# Patient Record
Sex: FEMALE | Race: WHITE | NOT HISPANIC OR LATINO | Employment: OTHER | URBAN - METROPOLITAN AREA
[De-identification: names, ages, dates, MRNs, and addresses within clinical notes are randomized per-mention and may not be internally consistent; named-entity substitution may affect disease eponyms.]

---

## 2023-07-07 LAB
ALANINE AMINOTRANSFERASE (SGPT) (U/L) IN SER/PLAS: 25 U/L (ref 7–45)
ALBUMIN (G/DL) IN SER/PLAS: 4.1 G/DL (ref 3.4–5)
ALBUMIN (MG/L) IN URINE: <7 MG/L
ALBUMIN/CREATININE (UG/MG) IN URINE: NORMAL UG/MG CRT (ref 0–30)
ALKALINE PHOSPHATASE (U/L) IN SER/PLAS: 65 U/L (ref 33–136)
ANION GAP IN SER/PLAS: 11 MMOL/L (ref 10–20)
APPEARANCE, URINE: CLEAR
ASPARTATE AMINOTRANSFERASE (SGOT) (U/L) IN SER/PLAS: 21 U/L (ref 9–39)
BASOPHILS (10*3/UL) IN BLOOD BY AUTOMATED COUNT: 0.06 X10E9/L (ref 0–0.1)
BASOPHILS/100 LEUKOCYTES IN BLOOD BY AUTOMATED COUNT: 0.9 % (ref 0–2)
BILIRUBIN TOTAL (MG/DL) IN SER/PLAS: 0.5 MG/DL (ref 0–1.2)
BILIRUBIN, URINE: NEGATIVE
BLOOD, URINE: NEGATIVE
CALCIUM (MG/DL) IN SER/PLAS: 9.3 MG/DL (ref 8.6–10.3)
CARBON DIOXIDE, TOTAL (MMOL/L) IN SER/PLAS: 29 MMOL/L (ref 21–32)
CHLORIDE (MMOL/L) IN SER/PLAS: 100 MMOL/L (ref 98–107)
CHOLESTEROL (MG/DL) IN SER/PLAS: 178 MG/DL (ref 0–199)
CHOLESTEROL IN HDL (MG/DL) IN SER/PLAS: 81.8 MG/DL
CHOLESTEROL/HDL RATIO: 2.2
COLOR, URINE: ABNORMAL
CREATININE (MG/DL) IN SER/PLAS: 0.82 MG/DL (ref 0.5–1.05)
CREATININE (MG/DL) IN URINE: 38.7 MG/DL (ref 20–320)
EOSINOPHILS (10*3/UL) IN BLOOD BY AUTOMATED COUNT: 0.13 X10E9/L (ref 0–0.4)
EOSINOPHILS/100 LEUKOCYTES IN BLOOD BY AUTOMATED COUNT: 2 % (ref 0–6)
ERYTHROCYTE DISTRIBUTION WIDTH (RATIO) BY AUTOMATED COUNT: 12.2 % (ref 11.5–14.5)
ERYTHROCYTE MEAN CORPUSCULAR HEMOGLOBIN CONCENTRATION (G/DL) BY AUTOMATED: 33.2 G/DL (ref 32–36)
ERYTHROCYTE MEAN CORPUSCULAR VOLUME (FL) BY AUTOMATED COUNT: 96 FL (ref 80–100)
ERYTHROCYTES (10*6/UL) IN BLOOD BY AUTOMATED COUNT: 4.38 X10E12/L (ref 4–5.2)
GFR FEMALE: 76 ML/MIN/1.73M2
GLUCOSE (MG/DL) IN SER/PLAS: 86 MG/DL (ref 74–99)
GLUCOSE, URINE: NEGATIVE MG/DL
HEMATOCRIT (%) IN BLOOD BY AUTOMATED COUNT: 41.9 % (ref 36–46)
HEMOGLOBIN (G/DL) IN BLOOD: 13.9 G/DL (ref 12–16)
IMMATURE GRANULOCYTES/100 LEUKOCYTES IN BLOOD BY AUTOMATED COUNT: 0.3 % (ref 0–0.9)
KETONES, URINE: NEGATIVE MG/DL
LDL: 81 MG/DL (ref 0–99)
LEUKOCYTE ESTERASE, URINE: ABNORMAL
LEUKOCYTES (10*3/UL) IN BLOOD BY AUTOMATED COUNT: 6.5 X10E9/L (ref 4.4–11.3)
LYMPHOCYTES (10*3/UL) IN BLOOD BY AUTOMATED COUNT: 1.91 X10E9/L (ref 0.8–3)
LYMPHOCYTES/100 LEUKOCYTES IN BLOOD BY AUTOMATED COUNT: 29.6 % (ref 13–44)
MONOCYTES (10*3/UL) IN BLOOD BY AUTOMATED COUNT: 0.67 X10E9/L (ref 0.05–0.8)
MONOCYTES/100 LEUKOCYTES IN BLOOD BY AUTOMATED COUNT: 10.4 % (ref 2–10)
NEUTROPHILS (10*3/UL) IN BLOOD BY AUTOMATED COUNT: 3.66 X10E9/L (ref 1.6–5.5)
NEUTROPHILS/100 LEUKOCYTES IN BLOOD BY AUTOMATED COUNT: 56.8 % (ref 40–80)
NITRITE, URINE: NEGATIVE
PH, URINE: 8 (ref 5–8)
PLATELETS (10*3/UL) IN BLOOD AUTOMATED COUNT: 294 X10E9/L (ref 150–450)
POTASSIUM (MMOL/L) IN SER/PLAS: 3.6 MMOL/L (ref 3.5–5.3)
PROTEIN TOTAL: 6.6 G/DL (ref 6.4–8.2)
PROTEIN, URINE: NEGATIVE MG/DL
RBC, URINE: <1 /HPF (ref 0–5)
SODIUM (MMOL/L) IN SER/PLAS: 136 MMOL/L (ref 136–145)
SPECIFIC GRAVITY, URINE: 1.01 (ref 1–1.03)
SQUAMOUS EPITHELIAL CELLS, URINE: 1 /HPF
TRIGLYCERIDE (MG/DL) IN SER/PLAS: 76 MG/DL (ref 0–149)
UREA NITROGEN (MG/DL) IN SER/PLAS: 11 MG/DL (ref 6–23)
UROBILINOGEN, URINE: <2 MG/DL (ref 0–1.9)
VLDL: 15 MG/DL (ref 0–40)
WBC, URINE: <1 /HPF (ref 0–5)

## 2024-10-22 ENCOUNTER — HOSPITAL ENCOUNTER (OUTPATIENT)
Dept: RADIOLOGY | Facility: CLINIC | Age: 74
Discharge: HOME | End: 2024-10-22
Payer: MEDICARE

## 2024-10-22 ENCOUNTER — OFFICE VISIT (OUTPATIENT)
Dept: ORTHOPEDIC SURGERY | Facility: CLINIC | Age: 74
End: 2024-10-22
Payer: MEDICARE

## 2024-10-22 VITALS — HEIGHT: 63 IN | WEIGHT: 130 LBS | BODY MASS INDEX: 23.04 KG/M2

## 2024-10-22 DIAGNOSIS — M54.50 LUMBAR PAIN: ICD-10-CM

## 2024-10-22 DIAGNOSIS — M54.16 LUMBAR RADICULOPATHY: Primary | ICD-10-CM

## 2024-10-22 PROCEDURE — 1125F AMNT PAIN NOTED PAIN PRSNT: CPT | Performed by: ORTHOPAEDIC SURGERY

## 2024-10-22 PROCEDURE — 3008F BODY MASS INDEX DOCD: CPT | Performed by: ORTHOPAEDIC SURGERY

## 2024-10-22 PROCEDURE — 99204 OFFICE O/P NEW MOD 45 MIN: CPT | Performed by: ORTHOPAEDIC SURGERY

## 2024-10-22 PROCEDURE — 1036F TOBACCO NON-USER: CPT | Performed by: ORTHOPAEDIC SURGERY

## 2024-10-22 PROCEDURE — 99215 OFFICE O/P EST HI 40 MIN: CPT | Performed by: ORTHOPAEDIC SURGERY

## 2024-10-22 PROCEDURE — 72110 X-RAY EXAM L-2 SPINE 4/>VWS: CPT | Performed by: ORTHOPAEDIC SURGERY

## 2024-10-22 PROCEDURE — 72120 X-RAY BEND ONLY L-S SPINE: CPT

## 2024-10-22 RX ORDER — FUROSEMIDE 20 MG/1
TABLET ORAL
COMMUNITY
Start: 2024-10-11

## 2024-10-22 RX ORDER — ROSUVASTATIN CALCIUM 20 MG/1
1 TABLET, COATED ORAL NIGHTLY
COMMUNITY
Start: 2024-10-16

## 2024-10-22 RX ORDER — PANTOPRAZOLE SODIUM 40 MG/1
1 TABLET, DELAYED RELEASE ORAL
COMMUNITY
Start: 2024-07-03

## 2024-10-22 RX ORDER — SPIRONOLACTONE 25 MG/1
25 TABLET ORAL
COMMUNITY
Start: 2024-10-17

## 2024-10-22 RX ORDER — METOPROLOL TARTRATE AND HYDROCHLOROTHIAZIDE 100; 25 MG/1; MG/1
1 TABLET ORAL DAILY
COMMUNITY

## 2024-10-22 RX ORDER — ESTRADIOL 0.1 MG/G
CREAM VAGINAL
COMMUNITY
Start: 2024-03-27

## 2024-10-22 RX ORDER — METHOCARBAMOL 750 MG/1
750 TABLET, FILM COATED ORAL DAILY PRN
COMMUNITY
Start: 2024-06-26

## 2024-10-22 RX ORDER — PANTOPRAZOLE SODIUM 40 MG/1
40 TABLET, DELAYED RELEASE ORAL
COMMUNITY

## 2024-10-22 RX ORDER — TRIAMTERENE/HYDROCHLOROTHIAZID 37.5-25 MG
TABLET ORAL
COMMUNITY
Start: 2023-04-14

## 2024-10-22 RX ORDER — NEBIVOLOL 5 MG/1
1 TABLET ORAL
COMMUNITY
Start: 2024-08-02

## 2024-10-22 RX ORDER — BACLOFEN 5 MG/1
TABLET ORAL
COMMUNITY
Start: 2024-02-14

## 2024-10-22 RX ORDER — POTASSIUM CHLORIDE 1500 MG/1
TABLET, EXTENDED RELEASE ORAL
COMMUNITY

## 2024-10-22 RX ORDER — ACETAMINOPHEN 500 MG
500 TABLET ORAL EVERY 8 HOURS PRN
COMMUNITY

## 2024-10-22 RX ORDER — TRIAMTERENE AND HYDROCHLOROTHIAZIDE 37.5; 25 MG/1; MG/1
1 CAPSULE ORAL DAILY
COMMUNITY

## 2024-10-22 RX ORDER — AZITHROMYCIN 250 MG/1
TABLET, FILM COATED ORAL
COMMUNITY
Start: 2024-02-22

## 2024-10-22 RX ORDER — NAPROXEN SODIUM 220 MG/1
1 TABLET, FILM COATED ORAL DAILY
COMMUNITY

## 2024-10-22 RX ORDER — ALBUTEROL SULFATE 90 UG/1
INHALANT RESPIRATORY (INHALATION)
COMMUNITY

## 2024-10-22 RX ORDER — NITROGLYCERIN 0.4 MG/1
1 TABLET SUBLINGUAL AS NEEDED
COMMUNITY
Start: 2023-04-25

## 2024-10-22 RX ORDER — GABAPENTIN 100 MG/1
100 CAPSULE ORAL 3 TIMES DAILY
COMMUNITY

## 2024-10-22 RX ORDER — SCOLOPAMINE TRANSDERMAL SYSTEM 1 MG/1
PATCH, EXTENDED RELEASE TRANSDERMAL
COMMUNITY
Start: 2017-07-08

## 2024-10-22 RX ORDER — METOPROLOL SUCCINATE 25 MG/1
1 TABLET, EXTENDED RELEASE ORAL DAILY
COMMUNITY

## 2024-10-22 RX ORDER — HYDROCHLOROTHIAZIDE 25 MG/1
TABLET ORAL
COMMUNITY
Start: 2017-07-08

## 2024-10-22 RX ORDER — OXYCODONE HYDROCHLORIDE AND ACETAMINOPHEN 5; 325 MG/1; MG/1
TABLET ORAL
COMMUNITY
Start: 2024-10-17 | End: 2024-10-22

## 2024-10-22 RX ORDER — TRAMADOL HYDROCHLORIDE 50 MG/1
1 TABLET ORAL EVERY 8 HOURS PRN
COMMUNITY
Start: 2024-08-31

## 2024-10-22 RX ORDER — ROSUVASTATIN CALCIUM 20 MG/1
20 TABLET, COATED ORAL NIGHTLY
COMMUNITY

## 2024-10-22 ASSESSMENT — PAIN - FUNCTIONAL ASSESSMENT: PAIN_FUNCTIONAL_ASSESSMENT: 0-10

## 2024-10-22 ASSESSMENT — PAIN SCALES - GENERAL: PAINLEVEL_OUTOF10: 3

## 2024-10-22 NOTE — PROGRESS NOTES
Corinne Cruz is a 73 y.o. female who presents for New Patient Visit of the Lower Back (X-rays today/MRI done at [facility]    HPI:  73-year-old female here for new patient evaluation of low back pain.  X-rays today, she has an MRI on disc from [facility]  She denies any fever chills nausea vomiting night sweats.  She has no bowel or bladder complaints.    Physical exam:  Well-nourished, well kept.No lymphangitis or lymphadenopathy in the examined extremities.  Gait normal.  Can stand on heels and toes.   Examination of the back shows no tenderness in the paraspinous musculature.  There is no significant decreased range of motion in all directions due to guarding/muscle spasms and pain at extremes.  There is good strength and no instability.  Examination of the lower extremities reveals no point tenderness, swelling, or deformity.  Range of motion of the hips, knees, and ankles are full without crepitance, instability, or exacerbation of pain, except she is moderately tender over her right greater trochanteric bursa.  Strength is 5/5 throughout.  No redness, abrasions, or lesions on extremities  Gross sensation intact in the extremities.  Deep tendon reflexes 1+ bilateral. Clonus negative.  Affect normal.  Alert and oriented ×3.  Coordination normal.    Imaging studies:  We ordered and reviewed AP lateral flexion-extension plain films of the lumbar spine today.  An MRI from [facility] from June 21, 2024 was reviewed today.    Assessment:  73-year-old female here for new patient evaluation of mostly right leg pain.  She has an MRI in the system from [facility] she woke up about 6 months ago and noticed that her right leg was completely numb, that was followed by pain throughout the right leg.  She never had any real back pain to speak of.  No left-sided involvement.  She does not have a history of any recent physical therapy or chiropractic care, however she did see an acupuncturist that gave her minimal relief.  No history of back  surgery.  She has been seeing orthopedic specialist at [facility], and was referred out for injections so she went to [facility].  She did get a series of 3 injections into her low back, the most recent was around mid August or so.  She did finally get some relief of her right leg pain after the third injection, however she is definitely not back to 100% yet.  She is here to discuss options.  She is moderately tender over her right greater trochanteric bursa.  She has moderate central stenosis at L3-4 and L4-5 and L5-S1 but she has severe bilateral foraminal stenosis at L5-S1.  She has a small spondylolisthesis at L3 on 4, L4 on 5 and L5 on S1.    We have ordered and reviewed test today, x-rays, MRI.  We reviewed the notes from her orthopedic visits at [facility] from June 26, 2024 this does discuss her MRI results and her back pain.  This is an exacerbation of a chronic problem that is affecting her bodily function.  We did consider and discuss surgery today.    For complete plan and/or surgical details, please refer to Dr. Roberson's portion of this split dictation.    -Jacob Bates PA-C    In a face-to-face encounter, I performed a history and physical examination, discussed pertinent diagnostic studies if indicated, and discussed diagnosis and management strategies with both the patient and the midlevel provider.  I reviewed the midlevel's note and agree with the documented findings and plan of care.    Patient with a 6-month history of right leg pain weakness numbness and tingling.  The pain and numbness has improved somewhat with a few different epidurals.  Her last epidural was in August.  She thinks the weakness is still persistent.  She has difficulty with stairs.  She was playing pickle ball prior to all this and now she cannot do that because of some instability on her feet because of that right leg.  There is no significant left-sided symptoms.  This is all right-sided.  On exam I get a little  weakness with hip flexion and knee extension on the right.  She definitely has plantarflexion weakness on the right if she cannot get up on her tiptoes on the right side but she can on the left.  There is a little dorsiflexion weakness right versus left as well.  X-rays and MRI were reviewed.  She has degenerative disks and spondylolisthesis at L3-4 L4-5 and L5-S1.  She has central stenosis moderate and right sided lateral recess at L3-4 she has central stenosis moderate and right-sided lateral recess at L4-5 and she has severe bilateral foraminal stenosis at L5-S1 from her spondylolisthesis.    Assessment/plan: Patient with imaging studies and symptoms.  I explained to her the longer she sits on her weakness is more permanent it will be.  She understands that.  I had a long discussion with the patient and explained to them that their options are 1) live with the symptoms and see how they evolve, 2) physical therapy, 3) pain management or 4) surgery.    At this point the patient wants to try some physical therapy.  We did discuss and consider surgery on her today which would be an L3-S1 midline laminectomy the screws at all levels and a TLIF at L5-S1.  She wants to try to hold off on that for now.  She understands the potential risks of surgery as well as the potential ramifications of not having surgery particularly persistence and permanency of symptoms.    The patient will try some physical therapy as we give us a call if she wants to proceed with surgery.  She is already in pain management.  Steve Roberson MD  Orthopedic surgery

## 2024-10-28 ENCOUNTER — CLINICAL SUPPORT (OUTPATIENT)
Dept: URGENT CARE | Age: 74
End: 2024-10-28
Payer: MEDICARE

## 2024-10-28 VITALS
RESPIRATION RATE: 16 BRPM | SYSTOLIC BLOOD PRESSURE: 159 MMHG | HEART RATE: 73 BPM | OXYGEN SATURATION: 96 % | WEIGHT: 129 LBS | DIASTOLIC BLOOD PRESSURE: 90 MMHG | TEMPERATURE: 99 F | BODY MASS INDEX: 22.86 KG/M2 | HEIGHT: 63 IN

## 2024-10-28 DIAGNOSIS — H61.22 IMPACTED CERUMEN OF LEFT EAR: ICD-10-CM

## 2024-10-28 DIAGNOSIS — H66.002 NON-RECURRENT ACUTE SUPPURATIVE OTITIS MEDIA OF LEFT EAR WITHOUT SPONTANEOUS RUPTURE OF TYMPANIC MEMBRANE: Primary | ICD-10-CM

## 2024-10-28 PROCEDURE — 69210 REMOVE IMPACTED EAR WAX UNI: CPT | Performed by: PHYSICIAN ASSISTANT

## 2024-10-28 PROCEDURE — 99213 OFFICE O/P EST LOW 20 MIN: CPT | Performed by: PHYSICIAN ASSISTANT

## 2024-10-28 RX ORDER — AMOXICILLIN AND CLAVULANATE POTASSIUM 875; 125 MG/1; MG/1
875 TABLET, FILM COATED ORAL 2 TIMES DAILY
Qty: 14 TABLET | Refills: 0 | Status: SHIPPED | OUTPATIENT
Start: 2024-10-28 | End: 2024-11-04

## 2024-10-28 RX ORDER — FUROSEMIDE 20 MG/1
TABLET ORAL
COMMUNITY

## 2024-10-28 ASSESSMENT — ENCOUNTER SYMPTOMS
WHEEZING: 0
FATIGUE: 1
DEPRESSION: 0
COUGH: 1
FEVER: 0
SORE THROAT: 0
RHINORRHEA: 0
OCCASIONAL FEELINGS OF UNSTEADINESS: 0
SINUS PAIN: 0
LOSS OF SENSATION IN FEET: 0
MYALGIAS: 0
SWOLLEN GLANDS: 0
HEADACHES: 0

## 2024-11-26 ENCOUNTER — APPOINTMENT (OUTPATIENT)
Dept: ORTHOPEDIC SURGERY | Facility: CLINIC | Age: 74
End: 2024-11-26
Payer: MEDICARE

## 2025-01-20 ENCOUNTER — APPOINTMENT (OUTPATIENT)
Dept: NEUROSURGERY | Facility: CLINIC | Age: 75
End: 2025-01-20
Payer: MEDICARE

## 2025-01-20 VITALS
WEIGHT: 136.8 LBS | HEART RATE: 69 BPM | SYSTOLIC BLOOD PRESSURE: 116 MMHG | DIASTOLIC BLOOD PRESSURE: 60 MMHG | HEIGHT: 63 IN | BODY MASS INDEX: 24.24 KG/M2

## 2025-01-20 DIAGNOSIS — M54.16 LUMBAR RADICULOPATHY: Primary | ICD-10-CM

## 2025-01-20 PROCEDURE — 1125F AMNT PAIN NOTED PAIN PRSNT: CPT | Performed by: STUDENT IN AN ORGANIZED HEALTH CARE EDUCATION/TRAINING PROGRAM

## 2025-01-20 PROCEDURE — 1159F MED LIST DOCD IN RCRD: CPT | Performed by: STUDENT IN AN ORGANIZED HEALTH CARE EDUCATION/TRAINING PROGRAM

## 2025-01-20 PROCEDURE — 1036F TOBACCO NON-USER: CPT | Performed by: STUDENT IN AN ORGANIZED HEALTH CARE EDUCATION/TRAINING PROGRAM

## 2025-01-20 PROCEDURE — 99205 OFFICE O/P NEW HI 60 MIN: CPT | Performed by: STUDENT IN AN ORGANIZED HEALTH CARE EDUCATION/TRAINING PROGRAM

## 2025-01-20 PROCEDURE — 3008F BODY MASS INDEX DOCD: CPT | Performed by: STUDENT IN AN ORGANIZED HEALTH CARE EDUCATION/TRAINING PROGRAM

## 2025-01-20 RX ORDER — SCOPOLAMINE 1 MG/3D
1 PATCH, EXTENDED RELEASE TRANSDERMAL
COMMUNITY
Start: 2024-12-03

## 2025-01-20 ASSESSMENT — PATIENT HEALTH QUESTIONNAIRE - PHQ9
SUM OF ALL RESPONSES TO PHQ9 QUESTIONS 1 AND 2: 0
1. LITTLE INTEREST OR PLEASURE IN DOING THINGS: NOT AT ALL
2. FEELING DOWN, DEPRESSED OR HOPELESS: NOT AT ALL

## 2025-01-20 ASSESSMENT — PAIN SCALES - GENERAL: PAINLEVEL_OUTOF10: 2

## 2025-01-20 NOTE — PROGRESS NOTES
Corinne Cruz is a 74 y.o. year old female p/w low back pain and right leg pain for 2nd opinion.     14/14 systems reviewed and negative other than what is listed in the history of present illness        Past Medical History:   Diagnosis Date    Personal history of other diseases of the circulatory system     History of hypertension       Past Surgical History:   Procedure Laterality Date    OTHER SURGICAL HISTORY  08/02/2018    Arthroplasty For Hammertoe    US ASPIRATION INJECTION INTERMEDIATE JOINT  11/25/2019    US ASPIRATION INJECTION INTERMEDIATE JOINT 11/25/2019 [facility]           Current Outpatient Medications:     acetaminophen (Tylenol) 500 mg tablet, Take 1 tablet (500 mg) by mouth every 8 hours if needed., Disp: , Rfl:     albuterol 90 mcg/actuation inhaler, 2 PUFFS FOR CHEST CONGESTION/WHEEZING, INHALED UP TO 4 TIMES DAILY, AS NEEDED, Disp: , Rfl:     aspirin 81 mg chewable tablet, Chew 1 tablet (81 mg) once daily., Disp: , Rfl:     estradiol (Estrace) 0.01 % (0.1 mg/gram) vaginal cream, INSERT 1 GRAM VAGINAL ON MONDAY AND THURSDAY AS DIRECTED, Disp: , Rfl:     furosemide (Lasix) 20 mg tablet, Take by mouth., Disp: , Rfl:     metoprolol succinate XL (Toprol-XL) 25 mg 24 hr tablet, Take 1 tablet (25 mg) by mouth once daily., Disp: , Rfl:     nitroglycerin (Nitrostat) 0.4 mg SL tablet, Place 1 tablet (0.4 mg) under the tongue if needed., Disp: , Rfl:     pantoprazole (ProtoNix) 40 mg EC tablet, Take 1 tablet (40 mg) by mouth once daily in the morning. Take before meals., Disp: , Rfl:     rosuvastatin (Crestor) 20 mg tablet, Take 1 tablet (20 mg) by mouth once daily at bedtime., Disp: , Rfl:     scopolamine (Transderm-Scop) 1 mg over 3 days patch 3 day, Place 1 patch on the skin every 3rd day., Disp: , Rfl:     spironolactone (Aldactone) 25 mg tablet, 1 tablet (25 mg)., Disp: , Rfl:     hydroCHLOROthiazide (HYDRODiuril) 25 mg tablet, Take by mouth. (Patient not taking: Reported on 1/20/2025),  Disp: , Rfl:     Klor-Con M20 20 mEq ER tablet, take 1 tablet by mouth every day for 7 days (Patient not taking: Reported on 1/20/2025), Disp: , Rfl:     methocarbamol (Robaxin) 750 mg tablet, Take 1 tablet (750 mg) by mouth once daily as needed. (Patient not taking: Reported on 1/20/2025), Disp: , Rfl:     nebivolol (Bystolic) 5 mg tablet, Take 1 tablet (5 mg) by mouth early in the morning.. (Patient not taking: Reported on 1/20/2025), Disp: , Rfl:       Vitals:    01/20/25 1352   BP: 116/60   Pulse: 69     Objective   General: Well developed, awake/alert/oriented x3, no distress, alert and cooperative  Skin: Warm and dry, no lesions, no rashes  ENMT: Mucous membranes moist, no apparent injury, no lesions seen  Head/Neck: Neck Supple, no apparent injury  Respiratory/Thorax: Normal breath sounds with good chest expansion, thorax symmetric  Cardiovascular: No pitting edema, no JVD    Motor Strength: 5/5 Throughout all extremities    Muscle Bulk: Normal and symmetric in all extremities    Posture:   -- Cervical: Normal  -- Thoracic: Normal  -- Lumbar : Normal  Paraspinal muscle spasm/tenderness absent.     Sensation: intact to light touch     Relevant Results    flex-ex L-spine with L5-S1 spondylolisthesis with 9mm motion on flexion MRI L-spine: L3-4 disc with right eccentricity causing lateral recess stenosis, L4-5 disc herniations with uprward herniation with bilateral lateral recess stenosis, L5-S1 spondy with bilateral foraminal stneosis       Problem List Items Addressed This Visit    None  Visit Diagnoses       Lumbar radiculopathy    -  Primary    Relevant Orders    Referral to Physical Therapy    Referral to Physical Therapy               Assessment/Plan     Ms. Corinne Cruz is a very nice 74 y.o. female patient who presents today with subjective leg weakness. About 8 months ago, she started experiencing numbness and pain in her leg spontaneously. Her pain is mostly in the right leg, that radiates down to her  knees, occasionally goes down to her feet and calves. She has received injections which essentially decreased her right leg pain down to 1/10. She, however, feels that her endurance to walk has substantially decreased.    She endorses 3 months of good pain control since her injections. Currently on a good pain control regime with 1 Tylenol in the morning.     Given her symptoms have significantly improved, I recommend proceeding with conservation management at this point, will refer her for PT and Aquatherapy for strengthening.      Giulia Molina MD    of Neurosurgery   [facility] [place]   [facility]   Office: [phone]  Fax: [phone]     Scribe Attestation  By signing my name below, I, Pastora Jessica, Diamanteibe, attest that this documentation has been prepared under the direction and in the presence of Giulia Molina MD. Verbal consent obtained from the patient.

## 2025-08-07 ENCOUNTER — APPOINTMENT (OUTPATIENT)
Dept: SURGERY | Facility: CLINIC | Age: 75
End: 2025-08-07
Payer: MEDICARE

## 2025-08-07 DIAGNOSIS — K57.92 DIVERTICULITIS: Primary | ICD-10-CM

## 2025-08-07 PROCEDURE — 99204 OFFICE O/P NEW MOD 45 MIN: CPT | Performed by: SURGERY

## 2025-08-07 PROCEDURE — 1159F MED LIST DOCD IN RCRD: CPT | Performed by: SURGERY

## 2025-08-07 PROCEDURE — 1036F TOBACCO NON-USER: CPT | Performed by: SURGERY

## 2025-08-07 PROCEDURE — 1160F RVW MEDS BY RX/DR IN RCRD: CPT | Performed by: SURGERY

## 2025-08-07 NOTE — PROGRESS NOTES
Subjective   Patient ID: Corinne Cruz is a 74 y.o. female who presents for diverticulitis.  Referred for surgical options    HPI multiple episodes of diverticulitis throughout the life, at least 3 episodes over the last 12 months  Review of Systems review of 14 system at the time of the exam is negative  Physical Exam Pupils equal bilaterally, oral mucosa moist, bilateral breath sounds, clear to auscultation, regular rhythm and rate, no murmurs, abdomen is soft, nontender, nondistended, well-healed scar from previous laparoscopic procedures no palpable hernias, palpable peripheral pulse, no focal neurological motor deficits.  ENT exam within normal limits.  Musculoskeletal exam within normal limits.      Objective   I reviewed all available data including lab results, radiological studies, previous reports and notes.  CT scan consistent with sigmoid diverticulitis.  Colonoscopy consistent with a pandiverticulosis with most pronounced disease in the sigmoid colon  No diagnosis found.   Problem List[1]   RX Allergies[2]   Medication Documentation Review Audit       Reviewed by Shahid Lord MD (Physician) on 08/07/25 at 0814      Medication Order Taking? Sig Documenting Provider Last Dose Status   acetaminophen (Tylenol) 500 mg tablet [id number]  Take 1 tablet (500 mg) by mouth every 8 hours if needed. Historical Provider, MD  Active   albuterol 90 mcg/actuation inhaler [id number]  2 PUFFS FOR CHEST CONGESTION/WHEEZING, INHALED UP TO 4 TIMES DAILY, AS NEEDED Historical Provider, MD  Active   aspirin 81 mg chewable tablet [id number]  Chew 1 tablet (81 mg) once daily. Historical Provider, MD  Active   estradiol (Estrace) 0.01 % (0.1 mg/gram) vaginal cream [id number]  INSERT 1 GRAM VAGINAL ON MONDAY AND THURSDAY AS DIRECTED Historical Provider, MD  Active   furosemide (Lasix) 20 mg tablet [id number]  Take by mouth. Historical Provider, MD  Active   hydroCHLOROthiazide (HYDRODiuril) 25 mg tablet [id number]  Take by mouth.    Patient not taking: Reported on 1/20/2025    Historical Provider, MD  Active   Klor-Con M20 20 mEq ER tablet [id number]  take 1 tablet by mouth every day for 7 days   Patient not taking: Reported on 1/20/2025    Historical Provider, MD  Active   methocarbamol (Robaxin) 750 mg tablet [id number]  Take 1 tablet (750 mg) by mouth once daily as needed.   Patient not taking: Reported on 1/20/2025    Historical Provider, MD  Active   metoprolol succinate XL (Toprol-XL) 25 mg 24 hr tablet [id number]  Take 1 tablet (25 mg) by mouth once daily. Historical Provider, MD  Active   nebivolol (Bystolic) 5 mg tablet [id number]  Take 1 tablet (5 mg) by mouth early in the morning..   Patient not taking: Reported on 1/20/2025    Historical Provider, MD  Active   nitroglycerin (Nitrostat) 0.4 mg SL tablet [id number]  Place 1 tablet (0.4 mg) under the tongue if needed. Historical Provider, MD  Active   pantoprazole (ProtoNix) 40 mg EC tablet [id number]  Take 1 tablet (40 mg) by mouth once daily in the morning. Take before meals. Historical Provider, MD  Active   rosuvastatin (Crestor) 20 mg tablet [id number]  Take 1 tablet (20 mg) by mouth once daily at bedtime. Historical Provider, MD  Active   scopolamine (Transderm-Scop) 1 mg over 3 days patch 3 day [id number]  Place 1 patch on the skin every 3rd day. Historical Provider, MD  Active   spironolactone (Aldactone) 25 mg tablet [id number]  1 tablet (25 mg). Historical Provider, MD  Active                    Medical History[3]  Tobacco Use History[4]  Family History[5]   Surgical History[6]    Assessment/Plan   The patient with multiple recurrent episodes of diverticulitis.  The patient will benefit from robotic partial left colectomy, possible open, possible ostomy.  It would be done to prevent severe complications such as perforation, peritonitis, abscesses.  Risks, benefits, alternative treatment were explained to the patient.  All questions were answered.  Informed consent was  obtained.      Shahid Lord MD          [1] There is no problem list on file for this patient.  [2] No Known Allergies  [3]   Past Medical History:  Diagnosis Date    Abnormal ECG 2021    Bunion 2020    Colon polyp 2020    Coronary artery disease 2021    Diverticulitis of colon 2005    Hammer toe     History of transfusion 2021 CABG I think    Hypertension 2021    Lumbosacral disc disease 4/2024    Motion sickness 1971    Occatuonal, always while flying    Personal history of other diseases of the circulatory system     History of hypertension    Spinal stenosis 2024    Spondylolisthesis 2024    Tear of meniscus of knee 2022   [4]   Social History  Tobacco Use   Smoking Status Never   Smokeless Tobacco Never   [5]   Family History  Problem Relation Name Age of Onset    Cancer Father Albino 50 - 59    Cancer Paternal Grandmother Yazmin and my daughter Avani     Cancer Father's Sister Kandy and Dominique     Diabetes Maternal Grandmother Giuliana     Colon cancer Father Albino 50 - 59    Depression Daughter Avani 20 - 29    Blood clot Daughter Avani 20 - 29    Blood Disorder Daughter Avani 20 - 29    Clotting disorder Daughter Avani 20 - 29   [6]   Past Surgical History:  Procedure Laterality Date    BUNIONECTOMY  2021    ESOPHAGOGASTRODUODENOSCOPY  5/2025    HIP ARTHROPLASTY  2023    HYSTERECTOMY  1985    KNEE ARTHROPLASTY  2022    MENISCECTOMY      OTHER SURGICAL HISTORY  08/02/2018    Arthroplasty For Hammertoe    TOE SURGERY  2017    US ASPIRATION INJECTION INTERMEDIATE JOINT  11/25/2019    US ASPIRATION INJECTION INTERMEDIATE JOINT 11/25/2019 [facility]

## 2025-08-13 DIAGNOSIS — K57.92 DIVERTICULITIS: Primary | ICD-10-CM

## 2025-08-14 RX ORDER — NEOMYCIN SULFATE 500 MG/1
500 TABLET ORAL ONCE
Qty: 1 TABLET | Refills: 0 | Status: SHIPPED | OUTPATIENT
Start: 2025-08-14 | End: 2025-08-14

## 2025-08-14 RX ORDER — METRONIDAZOLE 500 MG/1
500 TABLET ORAL ONCE
Qty: 1 TABLET | Refills: 0 | Status: SHIPPED | OUTPATIENT
Start: 2025-08-14 | End: 2025-08-14

## 2025-08-19 LAB
NON-UH HIE BUN/CREAT RATIO: 26.2
NON-UH HIE BUN: 21 MG/DL (ref 9–23)
NON-UH HIE CALCIUM: 9.9 MG/DL (ref 8.7–10.4)
NON-UH HIE CALCULATED OSMOLALITY: 276 MOSM/KG (ref 275–295)
NON-UH HIE CHLORIDE: 104 MMOL/L (ref 98–107)
NON-UH HIE CO2, VENOUS: 21 MMOL/L (ref 20–31)
NON-UH HIE CREATININE: 0.8 MG/DL (ref 0.5–0.8)
NON-UH HIE GFR AA: >60
NON-UH HIE GLOMERULAR FILTRATION RATE: >60 ML/MIN/1.73M?
NON-UH HIE GLUCOSE: 107 MG/DL (ref 74–106)
NON-UH HIE HCT: 42.7 % (ref 36–46)
NON-UH HIE HGB: 14.2 G/DL (ref 12–16)
NON-UH HIE INSTR WBC ND: 13.2
NON-UH HIE K: 4.1 MMOL/L (ref 3.5–5.1)
NON-UH HIE MCH: 33.5 PG (ref 27–34)
NON-UH HIE MCHC: 33.3 G/DL (ref 32–37)
NON-UH HIE MCV: 100.7 FL (ref 80–100)
NON-UH HIE MPV: 7.4 FL (ref 7.4–10.4)
NON-UH HIE NA: 136 MMOL/L (ref 135–145)
NON-UH HIE PLATELET: 272 X10 (ref 150–450)
NON-UH HIE RBC: 4.24 X10 (ref 4.2–5.4)
NON-UH HIE RDW: 13.7 % (ref 11.5–14.5)
NON-UH HIE WBC: 13.2 X10 (ref 4.5–11)